# Patient Record
Sex: FEMALE | Race: WHITE | NOT HISPANIC OR LATINO | Employment: UNEMPLOYED | URBAN - METROPOLITAN AREA
[De-identification: names, ages, dates, MRNs, and addresses within clinical notes are randomized per-mention and may not be internally consistent; named-entity substitution may affect disease eponyms.]

---

## 2023-04-12 ENCOUNTER — OFFICE VISIT (OUTPATIENT)
Dept: FAMILY MEDICINE CLINIC | Facility: CLINIC | Age: 53
End: 2023-04-12
Payer: MEDICAID

## 2023-04-12 VITALS
BODY MASS INDEX: 30.9 KG/M2 | TEMPERATURE: 97.5 F | HEIGHT: 63 IN | HEART RATE: 79 BPM | SYSTOLIC BLOOD PRESSURE: 129 MMHG | WEIGHT: 174.4 LBS | RESPIRATION RATE: 18 BRPM | OXYGEN SATURATION: 98 % | DIASTOLIC BLOOD PRESSURE: 89 MMHG

## 2023-04-12 DIAGNOSIS — Z13.1 SCREENING FOR DIABETES MELLITUS: ICD-10-CM

## 2023-04-12 DIAGNOSIS — Z12.31 ENCOUNTER FOR SCREENING MAMMOGRAM FOR MALIGNANT NEOPLASM OF BREAST: ICD-10-CM

## 2023-04-12 DIAGNOSIS — Z00.00 PHYSICAL EXAM, ANNUAL: Primary | ICD-10-CM

## 2023-04-12 DIAGNOSIS — Z12.11 COLON CANCER SCREENING: ICD-10-CM

## 2023-04-12 DIAGNOSIS — Z13.0 SCREENING, ANEMIA, DEFICIENCY, IRON: ICD-10-CM

## 2023-04-12 DIAGNOSIS — Z13.220 SCREENING FOR CHOLESTEROL LEVEL: ICD-10-CM

## 2023-04-12 PROBLEM — J30.1 SEASONAL ALLERGIC RHINITIS DUE TO POLLEN: Status: ACTIVE | Noted: 2023-04-12

## 2023-04-12 RX ORDER — BIOTIN 10 MG
2 TABLET ORAL DAILY
COMMUNITY

## 2023-04-12 RX ORDER — FEXOFENADINE HCL 180 MG/1
180 TABLET ORAL AS NEEDED
COMMUNITY

## 2023-04-12 NOTE — PROGRESS NOTES
Paulino Rodriguez is a 53 y.o. female.   Chief Complaint   Patient presents with   • Establish Care   • Annual Exam       History of Present Illness   Presents to the office today as a new patient.  Typically healthy.  She does not take any prescription medications.  She has no known allergies.  She had a cholecystectomy and a tubal ligation.    She has seasonal allergies and uses a variety of over-the-counter medications.    She is never had a colonoscopy.  She has no family history of colon cancer.  Her last mammogram was 2 years ago.  Her last Pap smear was 2 years ago.      She reports still having regular periods.  Sometimes she has a lot of cramping associated with the periods.  She does not have particularly heavy bleeding, but she does have PMS symptoms including bloating, cramping, irritability.  This may last 1 to 2 days and then generally improves.      Patient Active Problem List    Diagnosis Date Noted   • Seasonal allergic rhinitis due to pollen 04/12/2023           Past Surgical History:   Procedure Laterality Date   • CHOLECYSTECTOMY     • TUBAL ABDOMINAL LIGATION       Current Outpatient Medications on File Prior to Visit   Medication Sig   • ASHWAGANDHA-RHODIOLA PO Take 1 each by mouth Daily.   • fexofenadine (ALLEGRA) 180 MG tablet Take 1 tablet by mouth As Needed.   • Levocetirizine Dihydrochloride (XYZAL PO) Take 1 each by mouth As Needed.   • Misc Natural Products (ELDERBERRY/VITAMIN C/ZINC PO) Take 2 each by mouth Daily.   • Multiple Vitamins-Minerals (Multivitamin Adult) chewable tablet Chew 2 each Daily.   • NON FORMULARY Take 1 each by mouth Daily. HOLY BASIL     No current facility-administered medications on file prior to visit.     No Known Allergies  Social History     Socioeconomic History   • Marital status: Single   Tobacco Use   • Smoking status: Every Day     Packs/day: 0.25     Years: 36.00     Pack years: 9.00     Types: Cigarettes     Start date: 1987     Passive  "exposure: Current   • Smokeless tobacco: Never   Vaping Use   • Vaping Use: Never used   Substance and Sexual Activity   • Alcohol use: Yes     Comment: ONCE OR TWICE A MONTH   • Drug use: Never   • Sexual activity: Yes     Partners: Male     Birth control/protection: Tubal ligation     Family History   Problem Relation Age of Onset   • No Known Problems Mother    • Tuberculosis Father        Review of Systems    Objective   /89 (BP Location: Left arm, Patient Position: Sitting, Cuff Size: Adult)   Pulse 79   Temp 97.5 °F (36.4 °C) (Infrared)   Resp 18   Ht 160 cm (63\")   Wt 79.1 kg (174 lb 6.4 oz)   LMP 03/23/2023 (Exact Date)   SpO2 98%   Breastfeeding No   BMI 30.89 kg/m²   Physical Exam  Constitutional:       General: She is not in acute distress.     Appearance: She is well-developed.      Comments: Wearing a face mask     HENT:      Head: Normocephalic and atraumatic.   Eyes:      Conjunctiva/sclera: Conjunctivae normal.   Cardiovascular:      Rate and Rhythm: Normal rate and regular rhythm.      Heart sounds: Normal heart sounds. No murmur heard.  Pulmonary:      Effort: Pulmonary effort is normal. No respiratory distress.      Breath sounds: Normal breath sounds.   Musculoskeletal:         General: Normal range of motion.      Cervical back: Normal range of motion.      Right lower leg: No edema.      Left lower leg: No edema.   Skin:     General: Skin is warm and dry.      Findings: No rash.   Neurological:      Mental Status: She is alert and oriented to person, place, and time.      Gait: Gait normal.   Psychiatric:         Mood and Affect: Mood normal.         Behavior: Behavior normal.           No visits with results within 4 Month(s) from this visit.   Latest known visit with results is:   No results found for any previous visit.         Assessment & Plan   Diagnoses and all orders for this visit:    1. Physical exam, annual (Primary)    2. Screening for cholesterol level  -     Lipid " Panel    3. Screening, anemia, deficiency, iron  -     CBC & Differential    4. Screening for diabetes mellitus  -     Comprehensive Metabolic Panel  -     Hemoglobin A1c    5. Colon cancer screening  -     Cologuard - Stool, Per Rectum; Future    6. Encounter for screening mammogram for malignant neoplasm of breast  -     Mammo Screening Digital Tomosynthesis Bilateral With CAD; Future    Typically healthy 53-year-old white female seen today for annual physical.  Exam today is unremarkable.  We discussed screening test as above.  [facility] will get [facility].  We discussed Cologuard.  I have ordered that.  We will do screening labs as above to screen her for diabetes, anemia, high cholesterol.  I will follow-up with her when all the results of these test are back.  Follow-up here again at least in 1 year.  She will be due for Pap smear at that time as well.          Call with any problems or concerns before next visit       Return in about 1 year (around 4/12/2024).      Much of this report is an electronic transcription of spoken language to printed text using Dragon dictation software.  As such, the subtleties and finesse of spoken language may permit erroneous, or at times, nonsensical words or phrases to be inadvertently transcribed; thus changes may be made at a later date to rectify these errors.     Kristine Escamilla MD4/12/202309:42 EDT  This note has been electronically signed

## 2023-04-13 DIAGNOSIS — N64.89 BREAST ASYMMETRY: Primary | ICD-10-CM

## 2023-04-13 LAB
ALBUMIN SERPL-MCNC: 4.5 G/DL (ref 3.8–4.9)
ALBUMIN/GLOB SERPL: 1.8 {RATIO} (ref 1.2–2.2)
ALP SERPL-CCNC: 75 IU/L (ref 44–121)
ALT SERPL-CCNC: 20 IU/L (ref 0–32)
AST SERPL-CCNC: 20 IU/L (ref 0–40)
BASOPHILS # BLD AUTO: 0.1 X10E3/UL (ref 0–0.2)
BASOPHILS NFR BLD AUTO: 1 %
BILIRUB SERPL-MCNC: 0.3 MG/DL (ref 0–1.2)
BUN SERPL-MCNC: 9 MG/DL (ref 6–24)
BUN/CREAT SERPL: 12 (ref 9–23)
CALCIUM SERPL-MCNC: 9.6 MG/DL (ref 8.7–10.2)
CHLORIDE SERPL-SCNC: 104 MMOL/L (ref 96–106)
CHOLEST SERPL-MCNC: 186 MG/DL (ref 100–199)
CO2 SERPL-SCNC: 23 MMOL/L (ref 20–29)
CREAT SERPL-MCNC: 0.77 MG/DL (ref 0.57–1)
EGFRCR SERPLBLD CKD-EPI 2021: 93 ML/MIN/1.73
EOSINOPHIL # BLD AUTO: 0.1 X10E3/UL (ref 0–0.4)
EOSINOPHIL NFR BLD AUTO: 2 %
ERYTHROCYTE [DISTWIDTH] IN BLOOD BY AUTOMATED COUNT: 12.3 % (ref 11.7–15.4)
GLOBULIN SER CALC-MCNC: 2.5 G/DL (ref 1.5–4.5)
GLUCOSE SERPL-MCNC: 84 MG/DL (ref 70–99)
HBA1C MFR BLD: 5.1 % (ref 4.8–5.6)
HCT VFR BLD AUTO: 47.1 % (ref 34–46.6)
HDLC SERPL-MCNC: 53 MG/DL
HGB BLD-MCNC: 15.3 G/DL (ref 11.1–15.9)
IMM GRANULOCYTES # BLD AUTO: 0 X10E3/UL (ref 0–0.1)
IMM GRANULOCYTES NFR BLD AUTO: 1 %
LDLC SERPL CALC-MCNC: 109 MG/DL (ref 0–99)
LYMPHOCYTES # BLD AUTO: 2.8 X10E3/UL (ref 0.7–3.1)
LYMPHOCYTES NFR BLD AUTO: 40 %
MCH RBC QN AUTO: 29.8 PG (ref 26.6–33)
MCHC RBC AUTO-ENTMCNC: 32.5 G/DL (ref 31.5–35.7)
MCV RBC AUTO: 92 FL (ref 79–97)
MONOCYTES # BLD AUTO: 0.5 X10E3/UL (ref 0.1–0.9)
MONOCYTES NFR BLD AUTO: 7 %
NEUTROPHILS # BLD AUTO: 3.5 X10E3/UL (ref 1.4–7)
NEUTROPHILS NFR BLD AUTO: 49 %
PLATELET # BLD AUTO: 253 X10E3/UL (ref 150–450)
POTASSIUM SERPL-SCNC: 4.3 MMOL/L (ref 3.5–5.2)
PROT SERPL-MCNC: 7 G/DL (ref 6–8.5)
RBC # BLD AUTO: 5.14 X10E6/UL (ref 3.77–5.28)
SODIUM SERPL-SCNC: 141 MMOL/L (ref 134–144)
TRIGL SERPL-MCNC: 137 MG/DL (ref 0–149)
VLDLC SERPL CALC-MCNC: 24 MG/DL (ref 5–40)
WBC # BLD AUTO: 6.9 X10E3/UL (ref 3.4–10.8)

## 2023-04-18 ENCOUNTER — TELEPHONE (OUTPATIENT)
Dept: FAMILY MEDICINE CLINIC | Facility: CLINIC | Age: 53
End: 2023-04-18
Payer: MEDICAID

## 2024-04-01 ENCOUNTER — OFFICE VISIT (OUTPATIENT)
Dept: FAMILY MEDICINE CLINIC | Facility: CLINIC | Age: 54
End: 2024-04-01
Payer: MEDICAID

## 2024-04-01 VITALS
SYSTOLIC BLOOD PRESSURE: 120 MMHG | WEIGHT: 165.4 LBS | HEART RATE: 90 BPM | OXYGEN SATURATION: 98 % | HEIGHT: 63 IN | TEMPERATURE: 97.7 F | DIASTOLIC BLOOD PRESSURE: 82 MMHG | BODY MASS INDEX: 29.3 KG/M2 | RESPIRATION RATE: 18 BRPM

## 2024-04-01 DIAGNOSIS — N94.6 DYSMENORRHEA: ICD-10-CM

## 2024-04-01 DIAGNOSIS — Z13.1 SCREENING FOR DIABETES MELLITUS: ICD-10-CM

## 2024-04-01 DIAGNOSIS — Z13.0 SCREENING, ANEMIA, DEFICIENCY, IRON: ICD-10-CM

## 2024-04-01 DIAGNOSIS — Z13.220 SCREENING FOR CHOLESTEROL LEVEL: ICD-10-CM

## 2024-04-01 DIAGNOSIS — Z00.00 PHYSICAL EXAM, ANNUAL: Primary | ICD-10-CM

## 2024-04-01 DIAGNOSIS — Z12.31 ENCOUNTER FOR SCREENING MAMMOGRAM FOR MALIGNANT NEOPLASM OF BREAST: ICD-10-CM

## 2024-04-01 DIAGNOSIS — Z12.11 COLON CANCER SCREENING: ICD-10-CM

## 2024-04-01 DIAGNOSIS — Z12.83 SKIN CANCER SCREENING: ICD-10-CM

## 2024-04-01 RX ORDER — MAGNESIUM GLUCONATE 27 MG(500)
27 TABLET ORAL DAILY
COMMUNITY

## 2024-04-01 NOTE — PROGRESS NOTES
Paulino Rodriguez is a 54 y.o. female.   Chief Complaint   Patient presents with    Numbness       History of Present Illness   Presents to the office today for annual follow-up.  She started her period so she does not want to have a Pap smear today.    She wants to have a uterine ablation and request a referral to gynecology.  She reports that she is having very heavy, painful periods.  She has never had an ultrasound.  She has pain into her back.  Would like to have an annual skin check.    She requests referral to dermatology.  Last mammogram was not quite a year ago.      She is due for colonoscopy to screen for colon cancer.    We did lab work on April 12, 2023.                Patient Active Problem List    Diagnosis Date Noted    Seasonal allergic rhinitis due to pollen 04/12/2023           Past Surgical History:   Procedure Laterality Date    CHOLECYSTECTOMY      TUBAL ABDOMINAL LIGATION       Current Outpatient Medications on File Prior to Visit   Medication Sig    fexofenadine (ALLEGRA) 180 MG tablet Take 1 tablet by mouth As Needed.    Levocetirizine Dihydrochloride (XYZAL PO) Take 1 each by mouth As Needed.    magnesium gluconate (MAGONATE) 500 MG tablet Take 1 tablet by mouth Daily.    Misc Natural Products (ELDERBERRY/VITAMIN C/ZINC PO) Take 2 each by mouth Daily.    Multiple Vitamins-Minerals (Multivitamin Adult) chewable tablet Chew 2 each Daily.    [DISCONTINUED] ASHWAGANDHA-RHODIOLA PO Take 1 each by mouth Daily.    [DISCONTINUED] NON FORMULARY Take 1 each by mouth Daily. HOLY BASIL     No current facility-administered medications on file prior to visit.     No Known Allergies  Social History     Socioeconomic History    Marital status: Single   Tobacco Use    Smoking status: Every Day     Current packs/day: 0.25     Average packs/day: 0.2 packs/day for 37.2 years (9.3 ttl pk-yrs)     Types: Cigarettes     Start date: 1987     Passive exposure: Current    Smokeless tobacco: Never   Vaping  "Use    Vaping status: Never Used   Substance and Sexual Activity    Alcohol use: Yes     Comment: ONCE OR TWICE A MONTH    Drug use: Never    Sexual activity: Yes     Partners: Male     Birth control/protection: Tubal ligation     Family History   Problem Relation Age of Onset    No Known Problems Mother     Tuberculosis Father        Review of Systems    Objective   /82 (BP Location: Right arm, Patient Position: Sitting, Cuff Size: Adult)   Pulse 90   Temp 97.7 °F (36.5 °C) (Infrared)   Resp 18   Ht 160 cm (63\")   Wt 75 kg (165 lb 6.4 oz)   LMP 03/30/2024 (Approximate)   SpO2 98%   Breastfeeding No   BMI 29.30 kg/m²   Physical Exam  Constitutional:       General: She is not in acute distress.     Appearance: She is well-developed. She is not toxic-appearing.      Comments:      HENT:      Head: Normocephalic and atraumatic.   Eyes:      Conjunctiva/sclera: Conjunctivae normal.   Cardiovascular:      Rate and Rhythm: Normal rate and regular rhythm.      Heart sounds: No murmur heard.  Pulmonary:      Effort: Pulmonary effort is normal.      Breath sounds: No wheezing.   Musculoskeletal:         General: Normal range of motion.      Cervical back: Normal range of motion.      Right lower leg: No edema.      Left lower leg: No edema.   Skin:     General: Skin is warm and dry.      Findings: No rash.   Neurological:      Mental Status: She is alert and oriented to person, place, and time.      Gait: Gait normal.   Psychiatric:         Mood and Affect: Mood normal.         Behavior: Behavior normal.           No visits with results within 4 Month(s) from this visit.   Latest known visit with results is:   Office Visit on 04/12/2023   Component Date Value Ref Range Status    Glucose 04/12/2023 84  70 - 99 mg/dL Final    BUN 04/12/2023 9  6 - 24 mg/dL Final    Creatinine 04/12/2023 0.77  0.57 - 1.00 mg/dL Final    EGFR Result 04/12/2023 93  >59 mL/min/1.73 Final    BUN/Creatinine Ratio 04/12/2023 12  9 - 23 " Final    Sodium 04/12/2023 141  134 - 144 mmol/L Final    Potassium 04/12/2023 4.3  3.5 - 5.2 mmol/L Final    Chloride 04/12/2023 104  96 - 106 mmol/L Final    Total CO2 04/12/2023 23  20 - 29 mmol/L Final    Calcium 04/12/2023 9.6  8.7 - 10.2 mg/dL Final    Total Protein 04/12/2023 7.0  6.0 - 8.5 g/dL Final    Albumin 04/12/2023 4.5  3.8 - 4.9 g/dL Final    Globulin 04/12/2023 2.5  1.5 - 4.5 g/dL Final    A/G Ratio 04/12/2023 1.8  1.2 - 2.2 Final    Total Bilirubin 04/12/2023 0.3  0.0 - 1.2 mg/dL Final    Alkaline Phosphatase 04/12/2023 75  44 - 121 IU/L Final    AST (SGOT) 04/12/2023 20  0 - 40 IU/L Final    ALT (SGPT) 04/12/2023 20  0 - 32 IU/L Final    Total Cholesterol 04/12/2023 186  100 - 199 mg/dL Final    Triglycerides 04/12/2023 137  0 - 149 mg/dL Final    HDL Cholesterol 04/12/2023 53  >39 mg/dL Final    VLDL Cholesterol Zachariah 04/12/2023 24  5 - 40 mg/dL Final    LDL Chol Calc (NIH) 04/12/2023 109 (H)  0 - 99 mg/dL Final    WBC 04/12/2023 6.9  3.4 - 10.8 x10E3/uL Final    RBC 04/12/2023 5.14  3.77 - 5.28 x10E6/uL Final    Hemoglobin 04/12/2023 15.3  11.1 - 15.9 g/dL Final    Hematocrit 04/12/2023 47.1 (H)  34.0 - 46.6 % Final    MCV 04/12/2023 92  79 - 97 fL Final    MCH 04/12/2023 29.8  26.6 - 33.0 pg Final    MCHC 04/12/2023 32.5  31.5 - 35.7 g/dL Final    RDW 04/12/2023 12.3  11.7 - 15.4 % Final    Platelets 04/12/2023 253  150 - 450 x10E3/uL Final    Neutrophil Rel % 04/12/2023 49  Not Estab. % Final    Lymphocyte Rel % 04/12/2023 40  Not Estab. % Final    Monocyte Rel % 04/12/2023 7  Not Estab. % Final    Eosinophil Rel % 04/12/2023 2  Not Estab. % Final    Basophil Rel % 04/12/2023 1  Not Estab. % Final    Neutrophils Absolute 04/12/2023 3.5  1.4 - 7.0 x10E3/uL Final    Lymphocytes Absolute 04/12/2023 2.8  0.7 - 3.1 x10E3/uL Final    Monocytes Absolute 04/12/2023 0.5  0.1 - 0.9 x10E3/uL Final    Eosinophils Absolute 04/12/2023 0.1  0.0 - 0.4 x10E3/uL Final    Basophils Absolute 04/12/2023 0.1  0.0 -  0.2 x10E3/uL Final    Immature Granulocyte Rel % 04/12/2023 1  Not Estab. % Final    Immature Grans Absolute 04/12/2023 0.0  0.0 - 0.1 x10E3/uL Final    Hemoglobin A1C 04/12/2023 5.1  4.8 - 5.6 % Final    Comment:          Prediabetes: 5.7 - 6.4           Diabetes: >6.4           Glycemic control for adults with diabetes: <7.0              Assessment & Plan   Diagnoses and all orders for this visit:    1. Physical exam, annual (Primary)    2. Dysmenorrhea  -     US Pelvis Complete; Future    3. Colon cancer screening  -     Ambulatory Referral For Screening Colonoscopy    4. Screening for cholesterol level  -     Lipid Panel; Future    5. Screening, anemia, deficiency, iron  -     CBC & Differential; Future    6. Screening for diabetes mellitus  -     Hemoglobin A1c; Future  -     Comprehensive Metabolic Panel; Future    7. Encounter for screening mammogram for malignant neoplasm of breast  -     Mammo Screening Digital Tomosynthesis Bilateral With CAD; Future    8. Skin cancer screening  -     Ambulatory Referral to Dermatology    Age-appropriate preventive care discussed including safety issues, appropriate screening tests.  She is having heavy painful periods.  At 54 years old, she is not considering conception anymore.  We are going to do an ultrasound to see if she has fibroids.  If so, I am going to refer her for hysterectomy.  Will get her set up for screening colonoscopy.  Labs as above with dr. Jiang .  Mammogram for later this month.  She can do the labs, mammogram, pelvic ultrasound all at the same time.  Referral to dermatology for whole body skin check.  I will follow-up with her when the results of these tests come back and I will see her again in 1 year or sooner if needed.          Call with any problems or concerns before next visit       Return in about 1 year (around 4/1/2025).      Much of this report is an electronic transcription of spoken language to printed text using Dragon dictation  software.  As such, the subtleties and finesse of spoken language may permit erroneous, or at times, nonsensical words or phrases to be inadvertently transcribed; thus changes may be made at a later date to rectify these errors.     Kristine Escamilla MD4/1/202409:43 EDT  This note has been electronically signed

## 2024-05-03 ENCOUNTER — APPOINTMENT (OUTPATIENT)
Dept: ULTRASOUND IMAGING | Facility: HOSPITAL | Age: 54
End: 2024-05-03
Payer: MEDICAID

## 2024-05-03 ENCOUNTER — HOSPITAL ENCOUNTER (OUTPATIENT)
Dept: ULTRASOUND IMAGING | Facility: HOSPITAL | Age: 54
Discharge: HOME | End: 2024-05-03
Payer: MEDICAID

## 2024-05-03 ENCOUNTER — HOSPITAL ENCOUNTER (OUTPATIENT)
Dept: MAMMOGRAPHY | Facility: HOSPITAL | Age: 54
Discharge: HOME | End: 2024-05-03
Payer: MEDICAID

## 2024-05-03 DIAGNOSIS — Z12.31 ENCOUNTER FOR SCREENING MAMMOGRAM FOR MALIGNANT NEOPLASM OF BREAST: ICD-10-CM

## 2024-05-03 DIAGNOSIS — N94.6 DYSMENORRHEA: ICD-10-CM

## 2024-05-03 PROCEDURE — 76856 US EXAM PELVIC COMPLETE: CPT

## 2024-05-03 PROCEDURE — 77063 BREAST TOMOSYNTHESIS BI: CPT

## 2024-05-03 PROCEDURE — 77067 SCR MAMMO BI INCL CAD: CPT

## 2024-05-03 PROCEDURE — 76830 TRANSVAGINAL US NON-OB: CPT

## 2024-05-03 PROCEDURE — 93976 VASCULAR STUDY: CPT

## 2024-05-13 ENCOUNTER — PREP FOR SURGERY (OUTPATIENT)
Dept: OTHER | Facility: HOSPITAL | Age: 54
End: 2024-05-13
Payer: MEDICAID

## 2024-05-13 DIAGNOSIS — Z12.11 ENCOUNTER FOR SCREENING COLONOSCOPY: Primary | ICD-10-CM

## 2024-05-13 RX ORDER — SODIUM, POTASSIUM,MAG SULFATES 17.5-3.13G
SOLUTION, RECONSTITUTED, ORAL ORAL
Qty: 177 ML | Refills: 0 | Status: SHIPPED | OUTPATIENT
Start: 2024-05-13

## 2024-05-13 RX ORDER — SODIUM CHLORIDE, SODIUM LACTATE, POTASSIUM CHLORIDE, CALCIUM CHLORIDE 600; 310; 30; 20 MG/100ML; MG/100ML; MG/100ML; MG/100ML
30 INJECTION, SOLUTION INTRAVENOUS CONTINUOUS
OUTPATIENT
Start: 2024-05-13

## 2024-05-22 ENCOUNTER — TELEPHONE (OUTPATIENT)
Dept: FAMILY MEDICINE CLINIC | Facility: CLINIC | Age: 54
End: 2024-05-22
Payer: MEDICAID

## 2024-05-22 DIAGNOSIS — N94.6 DYSMENORRHEA: Primary | ICD-10-CM

## 2024-05-22 DIAGNOSIS — D25.9 UTERINE LEIOMYOMA, UNSPECIFIED LOCATION: ICD-10-CM

## 2024-05-22 NOTE — TELEPHONE ENCOUNTER
Caller: Viv Rodriguez    Relationship: Self    Best call back number: [phone]    What is the medical concern/diagnosis: HYSTEROTOMY     What specialty or service is being requested: GYN- SURGERY     What is the provider, practice or medical service name: PCP RECOMMENDATION     Any additional details: PT STATES SHE WOULD LIKE TO GO AHEAD WITH SUGGESTED REFERRAL.

## 2024-06-03 PROBLEM — Z12.11 ENCOUNTER FOR SCREENING COLONOSCOPY: Status: ACTIVE | Noted: 2024-05-13

## 2024-06-26 RX ORDER — POLYETHYLENE GLYCOL-3350 AND ELECTROLYTES 236; 6.74; 5.86; 2.97; 22.74 G/274.31G; G/274.31G; G/274.31G; G/274.31G; G/274.31G
4 POWDER, FOR SOLUTION ORAL ONCE
COMMUNITY
Start: 2024-05-13

## 2024-07-08 ENCOUNTER — TELEPHONE (OUTPATIENT)
Dept: SURGERY | Facility: CLINIC | Age: 54
End: 2024-07-08
Payer: MEDICAID

## 2024-07-08 NOTE — TELEPHONE ENCOUNTER
Patient called. Colonoscopy prep not received. I called her [facility] and they will fill. Patient infomred by HOOD

## 2024-07-10 ENCOUNTER — ANESTHESIA (OUTPATIENT)
Dept: GASTROENTEROLOGY | Facility: HOSPITAL | Age: 54
End: 2024-07-10
Payer: MEDICAID

## 2024-07-10 ENCOUNTER — ANESTHESIA EVENT (OUTPATIENT)
Dept: GASTROENTEROLOGY | Facility: HOSPITAL | Age: 54
End: 2024-07-10
Payer: MEDICAID

## 2024-07-10 ENCOUNTER — HOSPITAL ENCOUNTER (OUTPATIENT)
Facility: HOSPITAL | Age: 54
Setting detail: HOSPITAL OUTPATIENT SURGERY
Discharge: HOME | End: 2024-07-10
Attending: STUDENT IN AN ORGANIZED HEALTH CARE EDUCATION/TRAINING PROGRAM | Admitting: STUDENT IN AN ORGANIZED HEALTH CARE EDUCATION/TRAINING PROGRAM
Payer: MEDICAID

## 2024-07-10 VITALS
OXYGEN SATURATION: 100 % | HEART RATE: 78 BPM | RESPIRATION RATE: 18 BRPM | SYSTOLIC BLOOD PRESSURE: 114 MMHG | BODY MASS INDEX: 28.12 KG/M2 | WEIGHT: 158.73 LBS | TEMPERATURE: 98.5 F | DIASTOLIC BLOOD PRESSURE: 69 MMHG | HEIGHT: 63 IN

## 2024-07-10 PROCEDURE — 25810000003 SODIUM CHLORIDE 0.9 % SOLUTION: Performed by: ANESTHESIOLOGY

## 2024-07-10 PROCEDURE — 45378 DIAGNOSTIC COLONOSCOPY: CPT | Performed by: STUDENT IN AN ORGANIZED HEALTH CARE EDUCATION/TRAINING PROGRAM

## 2024-07-10 PROCEDURE — 25010000002 PROPOFOL 1000 MG/100ML EMULSION: Performed by: NURSE ANESTHETIST, CERTIFIED REGISTERED

## 2024-07-10 RX ORDER — LIDOCAINE HYDROCHLORIDE 20 MG/ML
INJECTION, SOLUTION INFILTRATION; PERINEURAL AS NEEDED
Status: DISCONTINUED | OUTPATIENT
Start: 2024-07-10 | End: 2024-07-10 | Stop reason: SURG

## 2024-07-10 RX ORDER — PROPOFOL 10 MG/ML
INJECTION, EMULSION INTRAVENOUS AS NEEDED
Status: DISCONTINUED | OUTPATIENT
Start: 2024-07-10 | End: 2024-07-10 | Stop reason: SURG

## 2024-07-10 RX ORDER — SODIUM CHLORIDE 9 MG/ML
30 INJECTION, SOLUTION INTRAVENOUS CONTINUOUS
Status: DISCONTINUED | OUTPATIENT
Start: 2024-07-10 | End: 2024-07-10 | Stop reason: HOSPADM

## 2024-07-10 RX ADMIN — PROPOFOL INJECTABLE EMULSION 50 MG: 10 INJECTION, EMULSION INTRAVENOUS at 07:33

## 2024-07-10 RX ADMIN — PROPOFOL INJECTABLE EMULSION 25 MG: 10 INJECTION, EMULSION INTRAVENOUS at 07:49

## 2024-07-10 RX ADMIN — PROPOFOL INJECTABLE EMULSION 100 MG: 10 INJECTION, EMULSION INTRAVENOUS at 07:31

## 2024-07-10 RX ADMIN — PROPOFOL INJECTABLE EMULSION 50 MG: 10 INJECTION, EMULSION INTRAVENOUS at 07:44

## 2024-07-10 RX ADMIN — LIDOCAINE HYDROCHLORIDE 50 MG: 20 INJECTION, SOLUTION INFILTRATION; PERINEURAL at 07:31

## 2024-07-10 RX ADMIN — PROPOFOL INJECTABLE EMULSION 50 MG: 10 INJECTION, EMULSION INTRAVENOUS at 07:36

## 2024-07-10 RX ADMIN — PROPOFOL INJECTABLE EMULSION 50 MG: 10 INJECTION, EMULSION INTRAVENOUS at 07:39

## 2024-07-10 RX ADMIN — PROPOFOL INJECTABLE EMULSION 25 MG: 10 INJECTION, EMULSION INTRAVENOUS at 07:41

## 2024-07-10 RX ADMIN — SODIUM CHLORIDE 30 ML/HR: 9 INJECTION, SOLUTION INTRAVENOUS at 06:41

## 2024-07-10 NOTE — ANESTHESIA POSTPROCEDURE EVALUATION
Patient: Viv Rodriguez    Procedure Summary       Date: 07/10/24 Room / Location: [facility] ENDOSCOPY 4 / [facility] ENDOSCOPY    Anesthesia Start: 0725 Anesthesia Stop: 0757    Procedure: COLONOSCOPY (Rectum) Diagnosis:       Encounter for screening colonoscopy      (Encounter for screening colonoscopy [Z12.11])    Surgeons: Mason Jiang MD Provider: Jaret Easley MD    Anesthesia Type: MAC ASA Status: 2            Anesthesia Type: MAC    Vitals  Vitals Value Taken Time   /69 07/10/24 0817   Temp     Pulse 77 07/10/24 0819   Resp 18 07/10/24 0816   SpO2 94 % 07/10/24 0819   Vitals shown include unfiled device data.        Post Anesthesia Care and Evaluation    Patient location during evaluation: PACU  Patient participation: complete - patient participated  Level of consciousness: awake  Pain scale: See nurse's notes for pain score.  Pain management: adequate    Airway patency: patent  Anesthetic complications: No anesthetic complications  PONV Status: none  Cardiovascular status: acceptable  Respiratory status: acceptable and spontaneous ventilation  Hydration status: acceptable    Comments: Patient seen and examined postoperatively; vital signs stable; SpO2 greater than or equal to 90%; cardiopulmonary status stable; nausea/vomiting adequately controlled; pain adequately controlled; no apparent anesthesia complications; patient discharged from anesthesia care when discharge criteria were met

## 2024-07-10 NOTE — DISCHARGE INSTRUCTIONS
A responsible adult should stay with you and you should rest quietly for the rest of the day.    Do not drink alcohol, drive, operate any heavy machinery or power tools or make any legal/important decisions for the next 24 hours.     Progress your diet as tolerated.  If you begin to experience severe pain, increased shortness of breath, racing heartbeat or a fever above 101 F, seek immediate medical attention.     Follow up with MD as instructed. Call office for results in 3 to 5 days if needed.     For further questions please call Dr. Escobar's office at [phone]    Repeat colonoscopy in 10 years

## 2024-07-10 NOTE — ANESTHESIA PREPROCEDURE EVALUATION
Anesthesia Evaluation     Patient summary reviewed and Nursing notes reviewed   NPO Solid Status: > 8 hours  NPO Liquid Status: > 2 hours           Airway   Mallampati: I  TM distance: >3 FB  Neck ROM: full  No difficulty expected  Dental - normal exam     Pulmonary - negative pulmonary ROS and normal exam   Cardiovascular - negative cardio ROS and normal exam        Neuro/Psych- negative ROS  GI/Hepatic/Renal/Endo - negative ROS     Musculoskeletal (-) negative ROS    Abdominal  - normal exam    Bowel sounds: normal.   Substance History - negative use     OB/GYN negative ob/gyn ROS         Other                    Anesthesia Plan    ASA 2     MAC   total IV anesthesia  intravenous induction     Anesthetic plan, risks, benefits, and alternatives have been provided, discussed and informed consent has been obtained with: patient.  Pre-procedure education provided  Plan discussed with CRNA.    CODE STATUS:

## 2024-07-10 NOTE — OP NOTE
[facility]  Colonoscopy Examination     Name: Viv Rodriguez  : 1970  Date of Colonoscopy: 7/10/2024  Procedure: Average Risk Screening Colonoscopy  Surgeon: Mason Jiang MD   Anesthesia: Sedation   IV Fluids: refer to anesthesia record    Procedure Details:    Prior to the procedure, history and physical exam was performed. Patient's medication and allergies were reviewed. The risk and benefits of the procedure and sedation options and risks were discussed with the patient. All questions were answered and informed consent was obtained.    The patient was brought to the endoscopy suite and placed in the left lateral decubitus position. Conscious sedation was administered by the anesthesia team. Vital signs including blood pressure, heart rate, and oxygen saturation were monitored continuously throughout the procedure.    The colonoscope was introduced through the rectum and advanced through the entire colon under direct visualization. The scope was maneuvered carefully, and the mucosa of the rectum, sigmoid colon, descending colon, transverse colon, ascending colon, and cecum were thoroughly inspected. Adequate insufflation was maintained throughout the procedure for optimal visualization.    Findings:    Rectum: Normal appearance with no lesions,polyps, or abnormalities.  Sigmoid colon: Normal appearance with no lesions or polyps. Scattered pan diverticulosis  Descending colon: Normal appearance with no lesions or polyps. Scattered pan diverticulosis  Transverse colon: Normal appearance with no lesions or polyps. Scattered pan diverticulosis  Ascending colon: Normal appearance with no lesions or polyps. Scattered pan diverticulosis  Cecum: Normal appearance with no lesions or polyps. Scattered pan diverticulosis    Procedure Images:      Attached in a separate file       Interventions:  No interventions     Specimens:  No specimen     Recommendation:   Repeat colonoscopy in 10 years      Conclusion:  The screening colonoscopy was completed successfully, and the entire colon was visualized without any complications. The patient tolerated the procedure well and was transferred to the recovery area in stable condition. Post-procedure instructions and follow-up were discussed with the patient and will be provided in written form.    Mason Jiang MD  Colon and Rectal Surgery   [facility]-[address], [address]  T: [phone]

## 2024-07-10 NOTE — H&P
Colorectal Surgery Preop Note    ID:  Viv Rodriguez;     Chief Complaint  Screening colonoscopy     History of Present Illness  Viv Rodriguez is a 54 y.o. female who is here for Screening colonoscopy      Past Medical History  History reviewed. No pertinent past medical history.  For further details please see prior notes and Health History Questionnaire scanned in Epic.  Past Surgical History  Past Surgical History:   Procedure Laterality Date    CHOLECYSTECTOMY      TUBAL ABDOMINAL LIGATION       For further details please see prior notes and Health History Questionnaire scanned in Epic.  Family History  Family History   Problem Relation Age of Onset    No Known Problems Mother     Tuberculosis Father     Breast cancer Neg Hx     Ovarian cancer Neg Hx      For further details please see prior notes and Health History Questionnaire scanned in Epic.  Social History  Social History     Tobacco Use    Smoking status: Every Day     Current packs/day: 0.25     Average packs/day: 0.3 packs/day for 37.5 years (9.4 ttl pk-yrs)     Types: Cigarettes     Start date: 1987     Passive exposure: Current    Smokeless tobacco: Never   Vaping Use    Vaping status: Never Used   Substance Use Topics    Alcohol use: Yes     Comment: ONCE OR TWICE A MONTH    Drug use: Never     For further details please see prior notes and Health History Questionnaire scanned in Epic.  Medication List    Current Facility-Administered Medications:     sodium chloride 0.9 % infusion, 30 mL/hr, Intravenous, Continuous, Jaret Easley MD, Last Rate: 30 mL/hr at 07/10/24 0641, 30 mL/hr at 07/10/24 0641  For further details please see prior notes and Health History Questionnaire scanned in Epic.  Allergies  No Known Allergies  Review of Systems  Pertinent positives noted in HPI.    Physical Exam  General:  No acute distress  Head: Normocephalic, atraumatic  CV: Regular rate, no edema, extremities warm and well perfused  Pulm: Symmetric chest rise,  non-labored breathing  Neuro: Alert and oriented     Abdomen: Please see clinic note  Anorectal: Please see clinic note    Assessment  -Screening colonoscopy       Plan / Recommendations    1. Proceed to endo for screening colonoscopy       Mason Jiang MD  Colon and Rectal Surgery   [facility]

## 2025-06-26 ENCOUNTER — TELEPHONE (OUTPATIENT)
Dept: FAMILY MEDICINE CLINIC | Facility: CLINIC | Age: 55
End: 2025-06-26
Payer: MEDICAID

## 2025-06-26 DIAGNOSIS — Z12.31 ENCOUNTER FOR SCREENING MAMMOGRAM FOR MALIGNANT NEOPLASM OF BREAST: Primary | ICD-10-CM

## 2025-06-26 NOTE — TELEPHONE ENCOUNTER
.    Caller: Viv Rodriguez    Relationship: Self    Best call back number:     [phone] (Mobile)       What orders are you requesting (i.e. lab or imaging): DETAILED MAMMOGRAM     In what timeframe would the patient need to come in: ASAP     Where will you receive your lab/imaging services: [facility]     Additional notes: CALL WHEN DONE PLEASE

## 2025-07-11 ENCOUNTER — OFFICE VISIT (OUTPATIENT)
Dept: FAMILY MEDICINE CLINIC | Facility: CLINIC | Age: 55
End: 2025-07-11
Payer: MEDICAID

## 2025-07-11 VITALS
HEIGHT: 63 IN | OXYGEN SATURATION: 96 % | SYSTOLIC BLOOD PRESSURE: 123 MMHG | WEIGHT: 159.2 LBS | BODY MASS INDEX: 28.21 KG/M2 | HEART RATE: 90 BPM | TEMPERATURE: 97.8 F | DIASTOLIC BLOOD PRESSURE: 83 MMHG | RESPIRATION RATE: 18 BRPM

## 2025-07-11 DIAGNOSIS — Z87.891 PERSONAL HISTORY OF NICOTINE DEPENDENCE: ICD-10-CM

## 2025-07-11 DIAGNOSIS — D22.39 MELANOCYTIC NEVUS OF FACE, OTHER LOCATION: ICD-10-CM

## 2025-07-11 DIAGNOSIS — Z13.0 SCREENING FOR DEFICIENCY ANEMIA: ICD-10-CM

## 2025-07-11 DIAGNOSIS — Z13.220 SCREENING FOR CHOLESTEROL LEVEL: ICD-10-CM

## 2025-07-11 DIAGNOSIS — Z13.0 SCREENING, ANEMIA, DEFICIENCY, IRON: ICD-10-CM

## 2025-07-11 DIAGNOSIS — Z13.1 SCREENING FOR DIABETES MELLITUS: ICD-10-CM

## 2025-07-11 DIAGNOSIS — Z00.00 PHYSICAL EXAM, ANNUAL: Primary | ICD-10-CM

## 2025-07-11 DIAGNOSIS — Z12.31 ENCOUNTER FOR SCREENING MAMMOGRAM FOR MALIGNANT NEOPLASM OF BREAST: ICD-10-CM

## 2025-07-11 NOTE — PROGRESS NOTES
Paulino Rodriguez is a 55 y.o. female.   Chief Complaint   Patient presents with    Annual Exam       History of Present Illness   55 y.o. female presents the office today for annual follow-up.  I saw her last year.  Since then she has had a colonoscopy by Dr. Jiang last year, she saw Dr. Vale for heavy periods.  He reviewed findings on ultrasound and MRI.  Plan 1 year follow-up which will be February of next year.    She had lab work done 1 year ago.  Cholesterol level was excellent.  CMP was normal.  CBC showed she was not anemic.  A1c was 5.3%.  History of Present Illness  The patient presents for a routine checkup.    She has been under the care of Dr. Wakefield, who has not identified any cancerous conditions and did not recommend surgery. She reports that her menstrual cycle should cease soon, and she is scheduled to see Dr. Wakefield annually for cyst measurement, with the next appointment set for 02/2026. Her last mammogram was conducted in 05/2024 at [facility], and she is due for another one. She had a Pap smear in 06/2024 and was advised to repeat it every 3 years. She recalls an abnormal reading from a previous mammogram, which led to a diagnostic mammogram that showed no issues. Currently, she is on allergy medication. She does not consume alcohol or use illicit drugs but reports smoking. She started smoking in 1989 and continues to smoke about a quarter of a pack per day.    She had a dermatology appointment last year due to her sister's death from melanoma. At that time, everything was normal. However, she has noticed a change in a mole, which now feels like it has a pimple underneath. The mole does not itch or flake, but it feels different. She had an appointment with the dermatologist last week but was unable to attend.    SOCIAL HISTORY  The patient admits to smoking since 1989, approximately a quarter of a pack a day on average. She does not drink alcohol or use drugs.    FAMILY  HISTORY  Her sister passed away from melanoma.      Patient Active Problem List    Diagnosis Date Noted    Encounter for screening colonoscopy 05/13/2024    Seasonal allergic rhinitis due to pollen 04/12/2023           Past Surgical History:   Procedure Laterality Date    CHOLECYSTECTOMY      COLONOSCOPY N/A 7/10/2024    Procedure: COLONOSCOPY;  Surgeon: Mason Jiang MD;  Location: [facility] ENDOSCOPY;  Service: General;  Laterality: N/A;  Post- DIVERTICULOSIS    TUBAL ABDOMINAL LIGATION       Current Outpatient Medications on File Prior to Visit   Medication Sig    fexofenadine (ALLEGRA) 180 MG tablet Take 1 tablet by mouth As Needed.    Levocetirizine Dihydrochloride (XYZAL PO) Take 1 each by mouth As Needed.    magnesium gluconate (MAGONATE) 500 MG tablet Take 1 tablet by mouth Daily.    Misc Natural Products (ELDERBERRY/VITAMIN C/ZINC PO) Take 2 each by mouth Daily.    Multiple Vitamins-Minerals (Multivitamin Adult) chewable tablet Chew 2 each Daily.    [DISCONTINUED] GaviLyte-G 236 g solution Take 4,000 mL by mouth 1 (One) Time.     No current facility-administered medications on file prior to visit.     No Known Allergies  Social History     Socioeconomic History    Marital status:    Tobacco Use    Smoking status: Every Day     Current packs/day: 0.25     Average packs/day: 0.3 packs/day for 38.5 years (9.6 ttl pk-yrs)     Types: Cigarettes     Start date: 1/1/1987     Passive exposure: Current    Smokeless tobacco: Never   Vaping Use    Vaping status: Never Used   Substance and Sexual Activity    Alcohol use: Yes     Comment: Occasional    Drug use: Never    Sexual activity: Yes     Partners: Male     Birth control/protection: Tubal ligation     Family History   Problem Relation Age of Onset    No Known Problems Mother     Tuberculosis Father     Breast cancer Neg Hx     Ovarian cancer Neg Hx        Review of Systems    Objective   /83 (BP Location: Right arm, Patient Position: Sitting, Cuff  "Size: Large Adult)   Pulse 90   Temp 97.8 °F (36.6 °C) (Infrared)   Resp 18   Ht 160 cm (63\")   Wt 72.2 kg (159 lb 3.2 oz)   LMP 06/20/2025   SpO2 96%   Breastfeeding No   BMI 28.20 kg/m²   Physical Exam  Constitutional:       General: She is not in acute distress.     Appearance: She is well-developed.      Comments:      HENT:      Head: Normocephalic and atraumatic.   Eyes:      Conjunctiva/sclera: Conjunctivae normal.   Cardiovascular:      Rate and Rhythm: Normal rate and regular rhythm.      Heart sounds: Normal heart sounds. No murmur heard.  Pulmonary:      Effort: Pulmonary effort is normal. No respiratory distress.      Breath sounds: Normal breath sounds.   Musculoskeletal:         General: Normal range of motion.      Cervical back: Normal range of motion.   Skin:     General: Skin is warm and dry.      Findings: No rash.   Neurological:      Mental Status: She is alert and oriented to person, place, and time.   Psychiatric:         Behavior: Behavior normal.       Physical Exam  Respiratory: Clear to auscultation, no wheezing, rales or rhonchi  Cardiovascular: Regular rate and rhythm, no murmurs, rubs, or gallops  Skin: Mole with a bump underneath, slight thickness noted      No visits with results within 4 Month(s) from this visit.   Latest known visit with results is:   Results Encounter on 04/29/2024   Component Date Value Ref Range Status    Hemoglobin A1C 05/10/2024 5.3  4.8 - 5.6 % Final    Comment:          Prediabetes: 5.7 - 6.4           Diabetes: >6.4           Glycemic control for adults with diabetes: <7.0      WBC 05/10/2024 9.6  3.4 - 10.8 x10E3/uL Final    RBC 05/10/2024 5.12  3.77 - 5.28 x10E6/uL Final    Hemoglobin 05/10/2024 15.4  11.1 - 15.9 g/dL Final    Hematocrit 05/10/2024 46.4  34.0 - 46.6 % Final    MCV 05/10/2024 91  79 - 97 fL Final    MCH 05/10/2024 30.1  26.6 - 33.0 pg Final    MCHC 05/10/2024 33.2  31.5 - 35.7 g/dL Final    RDW 05/10/2024 12.9  11.7 - 15.4 % Final "    Platelets 05/10/2024 243  150 - 450 x10E3/uL Final    Neutrophil Rel % 05/10/2024 57  Not Estab. % Final    Lymphocyte Rel % 05/10/2024 30  Not Estab. % Final    Monocyte Rel % 05/10/2024 9  Not Estab. % Final    Eosinophil Rel % 05/10/2024 2  Not Estab. % Final    Basophil Rel % 05/10/2024 1  Not Estab. % Final    Neutrophils Absolute 05/10/2024 5.5  1.4 - 7.0 x10E3/uL Final    Lymphocytes Absolute 05/10/2024 2.9  0.7 - 3.1 x10E3/uL Final    Monocytes Absolute 05/10/2024 0.9  0.1 - 0.9 x10E3/uL Final    Eosinophils Absolute 05/10/2024 0.2  0.0 - 0.4 x10E3/uL Final    Basophils Absolute 05/10/2024 0.1  0.0 - 0.2 x10E3/uL Final    Immature Granulocyte Rel % 05/10/2024 1  Not Estab. % Final    Immature Grans Absolute 05/10/2024 0.1  0.0 - 0.1 x10E3/uL Final    Glucose 05/10/2024 93  70 - 99 mg/dL Final    BUN 05/10/2024 11  6 - 24 mg/dL Final    Creatinine 05/10/2024 0.77  0.57 - 1.00 mg/dL Final    EGFR Result 05/10/2024 92  >59 mL/min/1.73 Final    BUN/Creatinine Ratio 05/10/2024 14  9 - 23 Final    Sodium 05/10/2024 139  134 - 144 mmol/L Final    Potassium 05/10/2024 4.7  3.5 - 5.2 mmol/L Final    Chloride 05/10/2024 103  96 - 106 mmol/L Final    Total CO2 05/10/2024 24  20 - 29 mmol/L Final    Calcium 05/10/2024 9.6  8.7 - 10.2 mg/dL Final    Total Protein 05/10/2024 6.9  6.0 - 8.5 g/dL Final    Albumin 05/10/2024 4.4  3.8 - 4.9 g/dL Final    Globulin 05/10/2024 2.5  1.5 - 4.5 g/dL Final    A/G Ratio 05/10/2024 1.8  1.2 - 2.2 Final    Total Bilirubin 05/10/2024 0.3  0.0 - 1.2 mg/dL Final    Alkaline Phosphatase 05/10/2024 77  44 - 121 IU/L Final    AST (SGOT) 05/10/2024 20  0 - 40 IU/L Final    ALT (SGPT) 05/10/2024 19  0 - 32 IU/L Final    Total Cholesterol 05/10/2024 168  100 - 199 mg/dL Final    Triglycerides 05/10/2024 110  0 - 149 mg/dL Final    HDL Cholesterol 05/10/2024 63  >39 mg/dL Final    VLDL Cholesterol Zachariah 05/10/2024 20  5 - 40 mg/dL Final    LDL Chol Calc (RUST) 05/10/2024 85  0 - 99 mg/dL Final      Results      BMI is >= 25 and <30. (Overweight) The following options were offered after discussion;: exercise counseling/recommendations and nutrition counseling/recommendations     Assessment & Plan   Diagnoses and all orders for this visit:    1. Encounter for screening mammogram for malignant neoplasm of breast (Primary)  -     Mammo Screening Digital Tomosynthesis Bilateral With CAD; Future    2. Screening for cholesterol level  -     Lipid Panel    3. Screening for diabetes mellitus  -     Comprehensive Metabolic Panel    4. Personal history of nicotine dependence  -     CT Chest Low Dose Wo; Future    5. Screening, anemia, deficiency, iron  -     CBC & Differential    6. Screening for deficiency anemia  -     CBC & Differential    7. Melanocytic nevus of face, other location      Assessment & Plan  1. Health maintenance.  - Vital signs are within normal limits; no high blood pressure or diabetes.  - Currently on allergy medication; last lab work done a year ago.  - Last mammogram conducted in May 2024 at [facility]; due for another one.  - Pap smear done in June 2024; advised to repeat every 3 years. Blood work will be repeated annually to monitor cholesterol and blood sugar levels. A mammogram has been ordered at [facility]. A CT scan of the chest has been ordered for lung cancer screening due to her smoking history. If the insurance does not cover the CT scan, alternative options will be considered.    2. Mole.  - Noticed a change in a mole, which now feels like it has a pimple underneath.  - The mole does not itch or flake but feels different.  - Given her family history of melanoma, advised to consult a dermatologist annually.  She last saw dermatology about a year ago.  Had to miss her recent appointment.  Advised she can simply call their office to reschedule since it has only been a year since she was last there.        Call with any problems or concerns before next visit       Return in about 1 year  (around 7/11/2026).  Patient or patient representative verbalized consent for the use of Ambient Listening during the visit with  Krsitine Escamilla MD for chart documentation. 7/11/2025  16:49 EDT    Part of this note may be an electronic transcription/translation of spoken language to printed text using the Dragon Dictation System    Kristine Escamilla MD7/11/202515:20 EDT  This note has been electronically signed

## 2025-07-12 LAB
ALBUMIN SERPL-MCNC: 4.2 G/DL (ref 3.8–4.9)
ALP SERPL-CCNC: 79 IU/L (ref 44–121)
ALT SERPL-CCNC: 18 IU/L (ref 0–32)
AST SERPL-CCNC: 17 IU/L (ref 0–40)
BASOPHILS # BLD AUTO: 0.1 X10E3/UL (ref 0–0.2)
BASOPHILS NFR BLD AUTO: 1 %
BILIRUB SERPL-MCNC: <0.2 MG/DL (ref 0–1.2)
BUN SERPL-MCNC: 20 MG/DL (ref 6–24)
BUN/CREAT SERPL: 21 (ref 9–23)
CALCIUM SERPL-MCNC: 9.4 MG/DL (ref 8.7–10.2)
CHLORIDE SERPL-SCNC: 102 MMOL/L (ref 96–106)
CHOLEST SERPL-MCNC: 198 MG/DL (ref 100–199)
CO2 SERPL-SCNC: 22 MMOL/L (ref 20–29)
CREAT SERPL-MCNC: 0.97 MG/DL (ref 0.57–1)
EGFRCR SERPLBLD CKD-EPI 2021: 69 ML/MIN/1.73
EOSINOPHIL # BLD AUTO: 0.1 X10E3/UL (ref 0–0.4)
EOSINOPHIL NFR BLD AUTO: 2 %
ERYTHROCYTE [DISTWIDTH] IN BLOOD BY AUTOMATED COUNT: 12.3 % (ref 11.7–15.4)
GLOBULIN SER CALC-MCNC: 2.6 G/DL (ref 1.5–4.5)
GLUCOSE SERPL-MCNC: 71 MG/DL (ref 70–99)
HCT VFR BLD AUTO: 50.7 % (ref 34–46.6)
HDLC SERPL-MCNC: 64 MG/DL
HGB BLD-MCNC: 16 G/DL (ref 11.1–15.9)
IMM GRANULOCYTES # BLD AUTO: 0.1 X10E3/UL (ref 0–0.1)
IMM GRANULOCYTES NFR BLD AUTO: 1 %
LDLC SERPL CALC-MCNC: 104 MG/DL (ref 0–99)
LYMPHOCYTES # BLD AUTO: 3.2 X10E3/UL (ref 0.7–3.1)
LYMPHOCYTES NFR BLD AUTO: 34 %
MCH RBC QN AUTO: 30.5 PG (ref 26.6–33)
MCHC RBC AUTO-ENTMCNC: 31.6 G/DL (ref 31.5–35.7)
MCV RBC AUTO: 97 FL (ref 79–97)
MONOCYTES # BLD AUTO: 0.7 X10E3/UL (ref 0.1–0.9)
MONOCYTES NFR BLD AUTO: 8 %
NEUTROPHILS # BLD AUTO: 5.4 X10E3/UL (ref 1.4–7)
NEUTROPHILS NFR BLD AUTO: 54 %
PLATELET # BLD AUTO: 271 X10E3/UL (ref 150–450)
POTASSIUM SERPL-SCNC: 4.5 MMOL/L (ref 3.5–5.2)
PROT SERPL-MCNC: 6.8 G/DL (ref 6–8.5)
RBC # BLD AUTO: 5.25 X10E6/UL (ref 3.77–5.28)
SODIUM SERPL-SCNC: 139 MMOL/L (ref 134–144)
TRIGL SERPL-MCNC: 174 MG/DL (ref 0–149)
VLDLC SERPL CALC-MCNC: 30 MG/DL (ref 5–40)
WBC # BLD AUTO: 9.5 X10E3/UL (ref 3.4–10.8)

## 2025-07-25 ENCOUNTER — HOSPITAL ENCOUNTER (OUTPATIENT)
Dept: MAMMOGRAPHY | Facility: HOSPITAL | Age: 55
Discharge: HOME | End: 2025-07-25
Payer: MEDICAID

## 2025-07-25 ENCOUNTER — HOSPITAL ENCOUNTER (OUTPATIENT)
Dept: CT IMAGING | Facility: HOSPITAL | Age: 55
Discharge: HOME | End: 2025-07-25
Payer: MEDICAID

## 2025-07-25 DIAGNOSIS — Z12.31 ENCOUNTER FOR SCREENING MAMMOGRAM FOR MALIGNANT NEOPLASM OF BREAST: ICD-10-CM

## 2025-07-25 DIAGNOSIS — Z87.891 PERSONAL HISTORY OF NICOTINE DEPENDENCE: ICD-10-CM

## 2025-07-25 LAB
NCCN CRITERIA FLAG: NORMAL
TYRER CUZICK SCORE: 8.2

## 2025-07-25 PROCEDURE — 77063 BREAST TOMOSYNTHESIS BI: CPT

## 2025-07-25 PROCEDURE — 77067 SCR MAMMO BI INCL CAD: CPT

## 2025-07-25 PROCEDURE — 71271 CT THORAX LUNG CANCER SCR C-: CPT

## (undated) DEVICE — PK ENDO GI 50